# Patient Record
Sex: MALE | Race: WHITE
[De-identification: names, ages, dates, MRNs, and addresses within clinical notes are randomized per-mention and may not be internally consistent; named-entity substitution may affect disease eponyms.]

---

## 2018-01-14 ENCOUNTER — HOSPITAL ENCOUNTER (EMERGENCY)
Dept: HOSPITAL 45 - C.EDB | Age: 38
Discharge: HOME | End: 2018-01-14
Payer: COMMERCIAL

## 2018-01-14 VITALS
OXYGEN SATURATION: 99 % | SYSTOLIC BLOOD PRESSURE: 135 MMHG | HEART RATE: 99 BPM | TEMPERATURE: 97.52 F | DIASTOLIC BLOOD PRESSURE: 77 MMHG

## 2018-01-14 VITALS
HEIGHT: 67.99 IN | BODY MASS INDEX: 26.3 KG/M2 | WEIGHT: 173.5 LBS | BODY MASS INDEX: 26.3 KG/M2 | HEIGHT: 67.99 IN | WEIGHT: 173.5 LBS

## 2018-01-14 DIAGNOSIS — Z80.9: ICD-10-CM

## 2018-01-14 DIAGNOSIS — F17.210: ICD-10-CM
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
## 2018-01-14 DIAGNOSIS — M54.6: Primary | ICD-10-CM

## 2018-01-14 DIAGNOSIS — M53.3: ICD-10-CM
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
## 2018-01-14 NOTE — DIAGNOSTIC IMAGING REPORT
THORACIC SPINE 3 VIEWS, lumbar spine 5 views



HISTORY:      fall, back pain



COMPARISON: None.



FINDINGS: There is no fracture.  No subluxation. Disc spaces are preserved.



IMPRESSION:  

No fracture or subluxation within the thoracic or lumbar spine.







Electronically signed by:  Domenic Davis M.D.

1/14/2018 3:18 PM



Dictated Date/Time:  1/14/2018 3:16 PM

## 2018-01-14 NOTE — EMERGENCY ROOM VISIT NOTE
History


First contact with patient:  13:42


Chief Complaint:  BACK PAIN


Stated Complaint:  FELL ON ICE,LOWER BACK PAIN





History of Present Illness


The patient is a 37 year old white male who presents to the Emergency Room with 

complaints of the rectal lumbar and sacral pain after falling on the ice last 

night.  Patient states he was getting out of the vehicle and slipped on the ice

, falling onto his left buttock.  He was able to ambulate last night.  Pain is 

persisted.  He is having difficulty in relating today.  No numbness or 

tingling.  Pain does radiate into his buttock.  It does not radiate down the 

leg.  He has a history of intermittent back pain.  No loss of bowel or bladder 

control.  His significant other accompanies him today.  No other treatment.  Of 

note, patient was previously on Suboxone last year.  His last prescription was 

filled in November for November and December.





Review of Systems


REVIEW OF SYSTEM:


HEENT:  No dizziness, visual problems, hearing loss, or tinnitus.  There is no 

difficulty swallowing and no oral lesions are present.


PULMONARY: No cough, shortness of breath, sputum production or hemoptysis.


CARDIOVASCULAR:  No chest pain, palpitations, shortness of breath or peripheral 

edema.


GASTROINTESTINAL:  No diarrhea, constipation, nausea, vomiting, or abdominal 

pain.


GENITOURINARY:  No dysuria, frequency, urgency or nocturia.


NEUROLOGIC:  No weakness, muscle tenderness, epilepsy or history of 

neurological problems.


Musculoskeletal: Significant for history of intermittent back pain


SKIN:  No rashes or lesions.


PSYCHIATRIC: No history of depression or mental illness.  History of opioid 

dependence.


ENDOCRINE:  No history of diabetes, thyroid disorders, or abnormal hair growth.





Past Medical/Surgical History


Surgical Problems:


(1) H/O hernia repair








Family History





Cancer





Social History


Smoking Status:  Current Every Day Smoker


Smokeless Tobacco Use:  No


Alcohol Use:  none


Drug Use:  none


Marital Status:  


Housing Status:  lives with family


Occupation Status:  unemployed





Current/Historical Medications


Scheduled PRN


Carisoprodol (Soma), 350 MG PO Q6H PRN for Pain





Physical Exam


Vital Signs











  Date Time  Temp Pulse Resp B/P (MAP) Pulse Ox O2 Delivery O2 Flow Rate FiO2


 


1/14/18 15:44 36.4 99 18 135/77 99   


 


1/14/18 13:31 36.4 99 18 135/77 99 Room Air  











Physical Exam


Gen.: Well-developed, well-nourished, young white male, in no acute distress.  

Obvious discomfort.  Sitting on a bed.  Alert and oriented. 


Skin:Warm and dry with good turgor.  No rashes or lesions.  No ecchymosis or 

erythema.  The patient is not  diaphoretic.  No abrasions.


Musculoskeletal: Back evaluation reveals no obvious asymmetry or deformity.  He 

has discomfort with palpation over the T12 and L1 vertebrae.  Moderate 

discomfort with palpation over the paraspinal muscles in the thoracic or lumbar 

region.  No pain with palpation over the rest of the vertebral bodies until 

reaching L5-S1.  Pain extends into the left SI joint.  No palpable step-off.  

Pain extends into his left buttock.  There is also pain with palpation over the 

ischial tuberosity.  No pain with palpation of the greater trochanters, IT bands

, or quadriceps musculature.  No discomfort with palpation over the right SI 

joint, right gluteus, or right ischial tuberosity.  Intact motor function to 

both lower extremities for hip flexion and knee extension.  Strength is 5/5 for 

resisted motion.  Limited back rotation secondary to discomfort.


Neurologic: Gross sensation is intact across the lower extremities by soft 

touch.  Patellar DTRs are 2+ bilaterally.





Medical Decision & Procedures


ER Provider


Diagnostic Interpretation:


Radiographic imaging obtained today of the thoracic spine and lumbar spine was 

reviewed by me and read by radiology.  No evidence for fracture, subluxation, 

or dislocation.  Disc spaces appear healthy.





ED Course


Patient was educated regarding today's findings.  Conservative care measures 

were discussed.  Radiographic imaging was obtained.  He was reassured that I 

find no evidence for fracture, subluxation, or dislocation.  Likelihood of 

muscle strain and SI joint dysfunction was discussed.  He'll follow up with his 

chiropractor for massage and adjustment.  He may also see his PCP for physical 

therapy referral.  Due to his Suboxone history, I will avoid straight narcotic 

therapy.  Tylenol and Motrin every 6 hours as needed for mild to moderate 

discomfort.  Ice or moist heat to the area based on comfort.  Gentle stretching 

daily.  He was given a prescription for Soma 350 mg to use one pill every 6 

hours as needed for pain and spasm.  Driving precautions were given.  Return to 

the ED for any acute changes including loss of bowel or bladder control.





Medical Decision


Possibility of compression fracture, facet fracture, transverse process fracture

, disc injury, nerve root impingement, and muscle strain were considered among 

others.





[place] Drug Monitoring Program


Search Results:  patient reviewed within database





Medication Reconcilliation


Current Medication List:  was personally reviewed by me





Blood Pressure Screening


Patient's blood pressure:  Normal blood pressure





Impression





 Primary Impression:  


 Thoracolumbar back pain


 Additional Impression:  


 Sacroiliac joint dysfunction of left side





Departure Information


Dispostion


Home / Self-Care





Condition


FAIR





Prescriptions





Carisoprodol (Soma) 350 Mg Tab


350 MG PO Q6H Y for Pain, #16 TAB


   Prov: David Grande,P.A.         1/14/18





Forms


HOME CARE DOCUMENTATION FORM,                                                 

               MOTRIN USE, TYLENOL USE, IMPORTANT VISIT INFORMATION





Patient Instructions


My [employer]





Additional Instructions





Ice intermittently as needed for discomfort


Gentle stretching daily


Tylenol and Motrin every 6 hours as needed for discomfort


Soma 1 pill every 6 hours as needed for pain/spasm-no driving


Follow-up with your PCP for a physical therapy referral or see your chiropractor





Problem Qualifiers